# Patient Record
(demographics unavailable — no encounter records)

---

## 2024-12-14 NOTE — DISCUSSION/SUMMARY
[FreeTextEntry1] : i can find no organic disease here either this is deconditioning and perceptiion or an enexplained post covid phenomenon

## 2024-12-14 NOTE — HISTORY OF PRESENT ILLNESS
[TextBox_4] : 50 man typically healthy chest pressure when running after covid, low energy for a while had cardiac work up negative, we went thru details vasovagal syncope chest film was fine. just feels tight  not sob

## 2024-12-14 NOTE — REASON FOR VISIT
[Follow-Up] : a follow-up visit [TextBox_44] : YOUNG MAN being seen for chest tightness while running, a few months post covid